# Patient Record
Sex: FEMALE | Race: WHITE
[De-identification: names, ages, dates, MRNs, and addresses within clinical notes are randomized per-mention and may not be internally consistent; named-entity substitution may affect disease eponyms.]

---

## 2018-04-15 ENCOUNTER — HOSPITAL ENCOUNTER (EMERGENCY)
Dept: HOSPITAL 74 - FER | Age: 63
Discharge: HOME | End: 2018-04-15
Payer: COMMERCIAL

## 2018-04-15 VITALS — SYSTOLIC BLOOD PRESSURE: 141 MMHG | DIASTOLIC BLOOD PRESSURE: 80 MMHG | TEMPERATURE: 98.6 F | HEART RATE: 70 BPM

## 2018-04-15 VITALS — BODY MASS INDEX: 20.2 KG/M2

## 2018-04-15 DIAGNOSIS — J18.1: Primary | ICD-10-CM

## 2018-04-15 NOTE — PDOC
History of Present Illness





- General


History Source: Patient


Exam Limitations: No Limitations





<Wandy Harman - Last Filed: 04/15/18 21:28>





- General


History Source: Patient


Exam Limitations: No Limitations





- History of Present Illness


Initial Comments: 





HPI:


The patient is a 62 year old female who presents to the emergency department 

complaining of productive cough, left rib pain, and nasal congestion. The 

patient reports having symptoms of upper respiratory infection and cough for 1 

month. She reports her symptoms improved, but became worse after going on 

vacation in [place]. She describes the sputum was yellow and dark green in color. 

The patient admits she has been sleeping for 16 hours a day which is not at 

baseline. She reports associated symptoms of chills, headache, nosebleed, and 

diaphoresis. 





The patient denies chest pain, shortness of breath, and dizziness.


Denies fevers, nausea, vomiting, diarrhea, and constipation.


Denies dysuria, frequency, urgency, and hematuria.





PAST MEDICAL HISTORY:  no significant history


PAST SURGICAL HISTORY:  Appendectomy, .


FAMILY HISTORY:  no pertinent history


SOCIAL HISTORY:  Pt lives with  family and is employed. Social alcohol use. No 

reported cigarette or drug use.


MEDICATIONS:  reviewed


ALLERGIES:  As per nursing notes








ROS:


General: (+)chills  No fevers, no weakness, no weight loss 


HEENT: No change in vision.  No sore throat. No ear pain


CardioVascular:  No chest pain or shortness of breath


Respiratory: (+)Productive cough. No wheezing. 


Gastrointestinal:  no nausea, vomiting, diarrhea or constipation,  No rectal 

bleeding


Genitourinary:  No dysuria, hematuria, or frequency


Musculoskeletal:  No joint or muscle pain or swelling


Neurologic: (+)Headache. No vertigo, dizziness or loss of consciousness


Psychiatric: nor depression 


Skin: No rashes or easy bruising


Endocrine: no increased thirst or abnormal weight change


Allergic: no skin or latex allergy


All other systems reviewed and normal





PE:


General: Well-nourished well-developed individual, no acute distress


HEENT: Throat: Normal, tonsils normal, no erythema or exudate


Neck: Supple, no meningeal signs, no lymphadenopathy


Eyes::Pupils equal reactive and round, extraocular motion intact


Chest: Nontender to palpation 


Cardiac: S1-S2 normal, regular rate and rhythm, no murmurs rubs or gallops


Respiratory: (+)Mild decrease in breath sounds in bases of lungs.


Extremities: Warm, dry, no cyanosis, clubbing, or edema


Skin: No rashes


Neuro: Alert and oriented x3, nonfocal exam, grossly intact, normal gait


Psych: Normal mood and affect








<Celi Segura - Last Filed: 04/15/18 21:31>





- General


Chief Complaint: Respiratory


Stated Complaint: COUGH


Time Seen by Provider: 04/15/18 20:35





Past History





- Past Medical History


COPD: No





- Surgical History


Appendectomy: Yes





- Suicide/Smoking/Psychosocial Hx


Smoking History: Never smoked


Have you smoked in the past 12 months: No


Hx Alcohol Use:  (social)





<Wandy Harman - Last Filed: 04/15/18 21:28>





<Celi Segura - Last Filed: 04/15/18 21:31>





- Past Medical History


Allergies/Adverse Reactions: 


 Allergies











Allergy/AdvReac Type Severity Reaction Status Date / Time


 


No Known Allergies Allergy   Verified 04/15/18 20:35











Home Medications: 


Ambulatory Orders





Amoxicillin/Potassium Clav [Augmentin 875-125 Tablet] 1 each PO BID #20 tablet 

04/15/18 


Azithromycin 250 mg PO DAILY #4 tablet 04/15/18 











*Physical Exam





- Vital Signs


 Last Vital Signs











Temp Pulse Resp BP Pulse Ox


 


 98.6 F   70   18   141/80   100 


 


 04/15/18 20:35  04/15/18 20:35  04/15/18 20:35  04/15/18 20:35  04/15/18 20:35














<Wandy Harman - Last Filed: 04/15/18 21:28>





- Vital Signs


 Last Vital Signs











Temp Pulse Resp BP Pulse Ox


 


 98.6 F   70   18   141/80   100 


 


 04/15/18 20:35  04/15/18 20:35  04/15/18 20:35  04/15/18 20:35  04/15/18 20:35














<Celi Segura - Last Filed: 04/15/18 21:31>





ED Treatment Course





- Medications


Given in the ED: 


ED Medications














Discontinued Medications














Generic Name Dose Route Start Last Admin





  Trade Name Freq  PRN Reason Stop Dose Admin


 


Amoxicillin/Clavulanate Potassium  1 tab  04/15/18 21:25  04/15/18 21:29





  Augmentin - 875mg Tablet  PO  04/15/18 21:26  1 tab





  ONCE ONE   Administration


 


Azithromycin  500 mg  04/15/18 21:24  04/15/18 21:28





  Azithromycin  PO  04/15/18 21:25  500 mg





  ONCE ONE   Administration














<Celi Segura - Last Filed: 04/15/18 21:31>





*DC/Admit/Observation/Transfer





<Wandy Harman - Last Filed: 04/15/18 21:28>





- Attestations


Scribe Attestion: 








Documentation prepared by Celi Segura, acting as medical scribe for 

Wandy Harman MD.





<Celi Segura - Last Filed: 04/15/18 21:31>


Diagnosis at time of Disposition: 


Pneumonia


Qualifiers:


 Pneumonia type: due to unspecified organism Laterality: left Lung location: 

lower lobe of lung Qualified Code(s): J18.1 - Lobar pneumonia, unspecified 

organism








- Discharge Dispostion


Disposition: HOME


Condition at time of disposition: Stable





- Prescriptions


Prescriptions: 


Amoxicillin/Potassium Clav [Augmentin 875-125 Tablet] 1 each PO BID #20 tablet


Azithromycin 250 mg PO DAILY #4 tablet





- Patient Instructions


Additional Instructions: 


 


Take azithromycin 1 tablet a day for the next 4 days





Take Augmentin one tablet twice a day for the next 10 days





Get a probiotic and some yogurt take on a daily basis until you finish the 

antibiotics.





Return to the emergency department immediately with ANY new, persistent or 

worsening symptoms.





Continue any medications as previously prescribed by your physician.





You should follow up with your primary doctor as soon as possible regarding 

today's emergency department visit.


.


Please make sure your doctor reviews the results of your emergency evaluation.





Thank you for coming to the   Emergency Department today for your care. It was 

a pleasure to see you today. Please note that your evaluation is INCOMPLETE 

until you  follow-up with your doctor.